# Patient Record
Sex: FEMALE | Race: ASIAN | Employment: FULL TIME | URBAN - METROPOLITAN AREA
[De-identification: names, ages, dates, MRNs, and addresses within clinical notes are randomized per-mention and may not be internally consistent; named-entity substitution may affect disease eponyms.]

---

## 2022-08-17 ENCOUNTER — TELEPHONE (OUTPATIENT)
Dept: OBGYN CLINIC | Facility: CLINIC | Age: 37
End: 2022-08-17

## 2022-08-17 PROBLEM — J45.909 ASTHMA: Status: ACTIVE | Noted: 2022-08-17

## 2022-08-17 NOTE — TELEPHONE ENCOUNTER
GA: 29w 0d  EED: 11/02/2022    Patient calling requesting transfer of care. States is going to a practice in [place] but she would prefer to deliver at [facility]. She currently works close to hospital.     Per patient she was told that she has an anterior placenta. She just turned 30years old (has not had to see MFM yet). States the 1HR glucose was normal. Patient has one more appointment scheduled at that practice. She is aware that if she's able to transfer we would need records prior to her first appointment. No further questions.

## 2022-08-17 NOTE — TELEPHONE ENCOUNTER
Patient calling to transfer OB care due to   [place] [id number] MARGARITA 11/2  Insurance bcbs     Future Appointments   Date Time Provider Malia Kat   10/10/2022 12:00 PM Jody Plata MD [facility]     Patient will be 36 weeks at appointment time. She will stay with her practice until her appointment.  She likes her practice but not the hospital where she would deliver